# Patient Record
Sex: FEMALE | Race: WHITE | NOT HISPANIC OR LATINO | Employment: FULL TIME | RURAL
[De-identification: names, ages, dates, MRNs, and addresses within clinical notes are randomized per-mention and may not be internally consistent; named-entity substitution may affect disease eponyms.]

---

## 2022-07-07 DIAGNOSIS — L40.9 PSORIASIS: Primary | ICD-10-CM

## 2022-08-19 ENCOUNTER — OFFICE VISIT (OUTPATIENT)
Dept: DERMATOLOGY | Facility: CLINIC | Age: 42
End: 2022-08-19
Payer: MEDICAID

## 2022-08-19 VITALS — HEIGHT: 68 IN | WEIGHT: 195 LBS | BODY MASS INDEX: 29.55 KG/M2

## 2022-08-19 DIAGNOSIS — Z79.899 HIGH RISK MEDICATION USE: Primary | ICD-10-CM

## 2022-08-19 DIAGNOSIS — L40.3 PLANTAR PUSTULAR PSORIASIS: ICD-10-CM

## 2022-08-19 PROCEDURE — 99204 OFFICE O/P NEW MOD 45 MIN: CPT | Mod: ,,, | Performed by: STUDENT IN AN ORGANIZED HEALTH CARE EDUCATION/TRAINING PROGRAM

## 2022-08-19 PROCEDURE — 99204 PR OFFICE/OUTPT VISIT, NEW, LEVL IV, 45-59 MIN: ICD-10-PCS | Mod: ,,, | Performed by: STUDENT IN AN ORGANIZED HEALTH CARE EDUCATION/TRAINING PROGRAM

## 2022-08-19 PROCEDURE — 3008F BODY MASS INDEX DOCD: CPT | Mod: CPTII,,, | Performed by: STUDENT IN AN ORGANIZED HEALTH CARE EDUCATION/TRAINING PROGRAM

## 2022-08-19 PROCEDURE — 3008F PR BODY MASS INDEX (BMI) DOCUMENTED: ICD-10-PCS | Mod: CPTII,,, | Performed by: STUDENT IN AN ORGANIZED HEALTH CARE EDUCATION/TRAINING PROGRAM

## 2022-08-19 RX ORDER — CLOBETASOL PROPIONATE 0.5 MG/G
OINTMENT TOPICAL
COMMUNITY
Start: 2022-07-01

## 2022-08-19 NOTE — PROGRESS NOTES
[facility]  Chalo Frost MD    [address], [place] [address]  [phone]    Fax: [phone]    Patient Name: Kady Montenegro  Medical Record Number: [id number]  PCP: FREEDOM Cui  Age: 42 y.o. : 1980  Contact: [phone] (home)     CC: psoriasis   History of Present Illness:     Kady Montenegro is a 42 y.o.  female with no history of skin cancer who presents to clinic today for psoriasis.  This has been present for 5 years. Symptoms include recent joint pain, pruritus, and spreading . Previous treatments include Clobetasol ointment . Other concerns today are rash of the bilateral arms. This has been present for 3 years . Symptoms includes severe pruritus. She has been treated for scabies in the past with no improvement.     The patient has no other concerns today.    Review of Systems:     Unremarkable other than mentioned above.     Physical Exam:     General: Relaxed, oriented, alert    Skin examination of the scalp, face, neck, chest, back, abdomen, upper extremities and lower extremities were normal except for as listed below        Assessment and Plan:     1. 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 High Risk Medication Monitoring : The risks and benefits of the medication were reviewed in full with the patient. Should any side effects occur, the patient will stop the medication and contact me immediately.  - CBC, CMP, Quant gold, hep panel today     Return to clinic in 4 months.    AVS printed with patient instructions     Chalo Frost MD    Mohs Surgery/Dermatologic Oncology  Dermatology